# Patient Record
Sex: FEMALE | Race: AMERICAN INDIAN OR ALASKA NATIVE
[De-identification: names, ages, dates, MRNs, and addresses within clinical notes are randomized per-mention and may not be internally consistent; named-entity substitution may affect disease eponyms.]

---

## 2021-12-02 ENCOUNTER — HOSPITAL ENCOUNTER (OUTPATIENT)
Dept: HOSPITAL 5 - ED | Age: 41
Setting detail: OBSERVATION
LOS: 1 days | Discharge: LEFT BEFORE DISCHARGE | End: 2021-12-03
Attending: INTERNAL MEDICINE | Admitting: HOSPITALIST
Payer: MEDICAID

## 2021-12-02 DIAGNOSIS — R10.12: ICD-10-CM

## 2021-12-02 DIAGNOSIS — K85.90: ICD-10-CM

## 2021-12-02 DIAGNOSIS — Z79.899: ICD-10-CM

## 2021-12-02 DIAGNOSIS — R41.0: ICD-10-CM

## 2021-12-02 DIAGNOSIS — N30.01: Primary | ICD-10-CM

## 2021-12-02 DIAGNOSIS — R11.2: ICD-10-CM

## 2021-12-02 DIAGNOSIS — Z98.890: ICD-10-CM

## 2021-12-02 DIAGNOSIS — F19.10: ICD-10-CM

## 2021-12-02 PROCEDURE — 96361 HYDRATE IV INFUSION ADD-ON: CPT

## 2021-12-02 PROCEDURE — 84443 ASSAY THYROID STIM HORMONE: CPT

## 2021-12-02 PROCEDURE — G0480 DRUG TEST DEF 1-7 CLASSES: HCPCS

## 2021-12-02 PROCEDURE — 87040 BLOOD CULTURE FOR BACTERIA: CPT

## 2021-12-02 PROCEDURE — 83735 ASSAY OF MAGNESIUM: CPT

## 2021-12-02 PROCEDURE — 80048 BASIC METABOLIC PNL TOTAL CA: CPT

## 2021-12-02 PROCEDURE — 87086 URINE CULTURE/COLONY COUNT: CPT

## 2021-12-02 PROCEDURE — 84703 CHORIONIC GONADOTROPIN ASSAY: CPT

## 2021-12-02 PROCEDURE — 93005 ELECTROCARDIOGRAM TRACING: CPT

## 2021-12-02 PROCEDURE — 81001 URINALYSIS AUTO W/SCOPE: CPT

## 2021-12-02 PROCEDURE — 36415 COLL VENOUS BLD VENIPUNCTURE: CPT

## 2021-12-02 PROCEDURE — 99285 EMERGENCY DEPT VISIT HI MDM: CPT

## 2021-12-02 PROCEDURE — 96372 THER/PROPH/DIAG INJ SC/IM: CPT

## 2021-12-02 PROCEDURE — 85007 BL SMEAR W/DIFF WBC COUNT: CPT

## 2021-12-02 PROCEDURE — 82140 ASSAY OF AMMONIA: CPT

## 2021-12-02 PROCEDURE — 85025 COMPLETE CBC W/AUTO DIFF WBC: CPT

## 2021-12-02 PROCEDURE — G0378 HOSPITAL OBSERVATION PER HR: HCPCS

## 2021-12-02 PROCEDURE — 71045 X-RAY EXAM CHEST 1 VIEW: CPT

## 2021-12-02 PROCEDURE — 80076 HEPATIC FUNCTION PANEL: CPT

## 2021-12-02 PROCEDURE — 96375 TX/PRO/DX INJ NEW DRUG ADDON: CPT

## 2021-12-02 PROCEDURE — 80307 DRUG TEST PRSMV CHEM ANLYZR: CPT

## 2021-12-02 PROCEDURE — 74177 CT ABD & PELVIS W/CONTRAST: CPT

## 2021-12-02 PROCEDURE — 85730 THROMBOPLASTIN TIME PARTIAL: CPT

## 2021-12-02 PROCEDURE — 83690 ASSAY OF LIPASE: CPT

## 2021-12-02 PROCEDURE — 85610 PROTHROMBIN TIME: CPT

## 2021-12-02 PROCEDURE — 96365 THER/PROPH/DIAG IV INF INIT: CPT

## 2021-12-02 PROCEDURE — 84484 ASSAY OF TROPONIN QUANT: CPT

## 2021-12-02 PROCEDURE — 71275 CT ANGIOGRAPHY CHEST: CPT

## 2021-12-02 PROCEDURE — 80320 DRUG SCREEN QUANTALCOHOLS: CPT

## 2021-12-03 VITALS — SYSTOLIC BLOOD PRESSURE: 104 MMHG | DIASTOLIC BLOOD PRESSURE: 64 MMHG

## 2021-12-03 LAB
ALBUMIN SERPL-MCNC: 4.5 G/DL (ref 3.9–5)
ALT SERPL-CCNC: 33 UNITS/L (ref 7–56)
APTT BLD: 29.7 SEC. (ref 24.2–36.6)
BAND NEUTROPHILS # (MANUAL): 0 K/MM3
BILIRUB DIRECT SERPL-MCNC: 0.5 MG/DL (ref 0–0.2)
BILIRUB UR QL STRIP: (no result)
BLOOD UR QL VISUAL: (no result)
BUN SERPL-MCNC: 9 MG/DL (ref 7–17)
BUN/CREAT SERPL: 11 %
CALCIUM SERPL-MCNC: 9.6 MG/DL (ref 8.4–10.2)
HCT VFR BLD CALC: 38.6 % (ref 30.3–42.9)
HEMOLYSIS INDEX: 5
HGB BLD-MCNC: 12.8 GM/DL (ref 10.1–14.3)
INR PPP: 0.85 (ref 0.87–1.13)
MCHC RBC AUTO-ENTMCNC: 33 % (ref 30–34)
MCV RBC AUTO: 91 FL (ref 79–97)
MUCOUS THREADS #/AREA URNS HPF: (no result) /HPF
MYELOCYTES # (MANUAL): 0 K/MM3
PH UR STRIP: 8 [PH] (ref 5–7)
PLATELET # BLD: 340 K/MM3 (ref 140–440)
PROMYELOCYTES # (MANUAL): 0 K/MM3
PROT UR STRIP-MCNC: (no result) MG/DL
RBC # BLD AUTO: 4.24 M/MM3 (ref 3.65–5.03)
RBC #/AREA URNS HPF: 24 /HPF (ref 0–6)
TOTAL CELLS COUNTED BLD: 100
UROBILINOGEN UR-MCNC: < 2 MG/DL (ref ?–2)
WBC #/AREA URNS HPF: 11 /HPF (ref 0–6)

## 2021-12-03 NOTE — HISTORY AND PHYSICAL REPORT
History of Present Illness


Date of examination: 12/03/21


Date of admission: 


12/03/21 04:48





Chief complaint: 





Abdominal pain


History of present illness: 





41-year-old female with history of pancreatitis was brought to the emergency 

room because of upper abdominal/lower chest pain which wraps around the left 

flank which has been dull but with intermittent exacerbation for the last 5 

hours. Patient states that she has a cavity and took some ibuprofen earlier. She

also admits to taking an ecstasy pill and smoking marijuana earlier. She tried 

to take a bath but developed severe pain in her upper abdomen/lower chest and 

left flank area which caused her to vomit. She also states that the pain is 

worsened by taking deep breaths and she feels short of breath. She denies use of

alcohol or any other drugs. She she has not tried taking anything for her 

symptoms. In the ambulance she received 30 mg of Toradol with only very 

temporary relief. According to the EMS report, the patient's fingerstick blood 

glucose was 155 and her vital signs were within normal limits. The patient 

states that her LMP was approximately 1 month ago. She is not vaccinated against

COVID-19. She says that for the past 1 week she has been working a lot and has 

not been eating very much. She denies any recent travel, surgery, or prolonged 

immobilization.She has a nonfocal neurologic exam but displays behavior 

consistent with hyperactive delirium, especially in the context of admitted drug

use.








In the emergency room CT scan of the abdomen shows mild periportal edema in the 

liver is a nonspecific finding a small corpus luteal cyst in the right ovary and

trace free fluid in the cul-de-sac are physiologic.  Patient also has a UTI.  So

going to admit the patient we will put the patient on IV fluid and antibiotic we

will send the urine for culture





Past History


Past Medical History: other (Pancreatitis)





Medications and Allergies


                                    Allergies











Allergy/AdvReac Type Severity Reaction Status Date / Time


 


No Known Allergies Allergy   Verified 12/03/21 01:35











Active Meds: 


Active Medications





Ziprasidone (Ziprasidone Mesylate 20 Mg Vial)  10 mg IM Q2H PRN


   PRN Reason: Agitation


   Last Admin: 12/03/21 01:54 Dose:  10 mg


   Documented by: 











Review of Systems


All systems: negative


Gastrointestinal: abdominal pain, nausea, vomiting





Exam





- Constitutional


Vitals: 


                                        











Temp Pulse Resp BP Pulse Ox


 


 98.4 F   90   20   140/73   98 


 


 12/03/21 00:07  12/03/21 00:07  12/03/21 00:48  12/03/21 00:07  12/03/21 00:07











General appearance: Present: no acute distress, well-nourished





- EENT


Eyes: Present: PERRL


ENT: hearing intact, clear oral mucosa





- Neck


Neck: Present: supple, normal ROM





- Respiratory


Respiratory effort: normal


Respiratory: bilateral: diminished





- Cardiovascular


Heart Sounds: Present: S1 & S2.  Absent: rub, click





- Extremities


Extremities: pulses symmetrical, No edema


Peripheral Pulses: within normal limits





- Abdominal


General gastrointestinal: Present: soft, tender, non-distended, normal bowel 

sounds


Female genitourinary: Present: normal





- Integumentary


Integumentary: Present: clear, warm, dry





- Musculoskeletal


Musculoskeletal: gait normal, strength equal bilaterally





- Psychiatric


Psychiatric: appropriate mood/affect, intact judgment & insight





- Neurologic


Neurologic: CNII-XII intact, moves all extremities





HEART Score





- HEART Score


Troponin: 


                                        











Troponin T  < 0.010 ng/mL (0.00-0.029)   12/03/21  01:28    














Results





- Labs


CBC & Chem 7: 


                                 12/03/21 01:28





                                 12/03/21 01:28


Labs: 


                             Laboratory Last Values











WBC  14.2 K/mm3 (4.5-11.0)  H  12/03/21  01:28    


 


RBC  4.24 M/mm3 (3.65-5.03)   12/03/21  01:28    


 


Hgb  12.8 gm/dl (10.1-14.3)   12/03/21  01:28    


 


Hct  38.6 % (30.3-42.9)   12/03/21  01:28    


 


MCV  91 fl (79-97)   12/03/21  01:28    


 


MCH  30 pg (28-32)   12/03/21  01:28    


 


MCHC  33 % (30-34)   12/03/21  01:28    


 


RDW  14.5 % (13.2-15.2)   12/03/21  01:28    


 


Plt Count  340 K/mm3 (140-440)   12/03/21  01:28    


 


Add Manual Diff  Complete   12/03/21  01:28    


 


Total Counted  100   12/03/21  01:28    


 


Seg Neuts % (Manual)  81.0 % (40.0-70.0)  H  12/03/21  01:28    


 


Lymphocytes % (Manual)  10.0 % (13.4-35.0)  L  12/03/21  01:28    


 


Monocytes % (Manual)  7.0 % (0.0-7.3)   12/03/21  01:28    


 


Eosinophils % (Manual)  1.0 % (0.0-4.3)   12/03/21  01:28    


 


Basophils % (Manual)  1.0 % (0.0-1.8)   12/03/21  01:28    


 


Nucleated RBC %  Not Reportable   12/03/21  01:28    


 


Seg Neutrophils # Man  11.5 K/mm3 (1.8-7.7)  H  12/03/21  01:28    


 


Band Neutrophils #  0.0 K/mm3  12/03/21  01:28    


 


Lymphocytes # (Manual)  1.4 K/mm3 (1.2-5.4)   12/03/21  01:28    


 


Abs React Lymphs (Man)  0.0 K/mm3  12/03/21  01:28    


 


Monocytes # (Manual)  1.0 K/mm3 (0.0-0.8)  H  12/03/21  01:28    


 


Eosinophils # (Manual)  0.1 K/mm3 (0.0-0.4)   12/03/21  01:28    


 


Basophils # (Manual)  0.1 K/mm3 (0.0-0.1)   12/03/21  01:28    


 


Metamyelocytes #  0.0 K/mm3  12/03/21  01:28    


 


Myelocytes #  0.0 K/mm3  12/03/21  01:28    


 


Promyelocytes #  0.0 K/mm3  12/03/21  01:28    


 


Blast Cells #  0.0 K/mm3  12/03/21  01:28    


 


WBC Morphology  Not Reportable   12/03/21  01:28    


 


Hypersegmented Neuts  Not Reportable   12/03/21  01:28    


 


Hyposegmented Neuts  Not Reportable   12/03/21  01:28    


 


Hypogranular Neuts  Not Reportable   12/03/21  01:28    


 


Smudge Cells  Not Reportable   12/03/21  01:28    


 


Toxic Granulation  Not Reportable   12/03/21  01:28    


 


Toxic Vacuolation  Not Reportable   12/03/21  01:28    


 


Dohle Bodies  Not Reportable   12/03/21  01:28    


 


Pelger-Huet Anomaly  Not Reportable   12/03/21  01:28    


 


Tereso Rods  Not Reportable   12/03/21  01:28    


 


Platelet Estimate  Not Reportable   12/03/21  01:28    


 


Clumped Platelets  Not Reportable   12/03/21  01:28    


 


Plt Clumps, EDTA  Not Reportable   12/03/21  01:28    


 


Large Platelets  Not Reportable   12/03/21  01:28    


 


Giant Platelets  Not Reportable   12/03/21  01:28    


 


Platelet Satelliting  Not Reportable   12/03/21  01:28    


 


Plt Morphology Comment  Not Reportable   12/03/21  01:28    


 


RBC Morphology  Normal   12/03/21  01:28    


 


Dimorphic RBCs  Not Reportable   12/03/21  01:28    


 


Polychromasia  Not Reportable   12/03/21  01:28    


 


Hypochromasia  Not Reportable   12/03/21  01:28    


 


Poikilocytosis  Not Reportable   12/03/21  01:28    


 


Anisocytosis  Not Reportable   12/03/21  01:28    


 


Microcytosis  Not Reportable   12/03/21  01:28    


 


Macrocytosis  Not Reportable   12/03/21  01:28    


 


Spherocytes  Not Reportable   12/03/21  01:28    


 


Pappenheimer Bodies  Not Reportable   12/03/21  01:28    


 


Sickle Cells  Not Reportable   12/03/21  01:28    


 


Target Cells  Not Reportable   12/03/21  01:28    


 


Tear Drop Cells  Not Reportable   12/03/21  01:28    


 


Ovalocytes  Not Reportable   12/03/21  01:28    


 


Helmet Cells  Not Reportable   12/03/21  01:28    


 


[place] Bodies  Not Reportable   12/03/21  01:28    


 


Cabot Rings  Not Reportable   12/03/21  01:28    


 


Cameron Cells  Not Reportable   12/03/21  01:28    


 


Bite Cells  Not Reportable   12/03/21  01:28    


 


Crenated Cell  Not Reportable   12/03/21  01:28    


 


Elliptocytes  Not Reportable   12/03/21  01:28    


 


Acanthocytes (Spur)  Not Reportable   12/03/21  01:28    


 


Rouleaux  Not Reportable   12/03/21  01:28    


 


Hemoglobin C Crystals  Not Reportable   12/03/21  01:28    


 


Schistocytes  Not Reportable   12/03/21  01:28    


 


Malaria parasites  Not Reportable   12/03/21  01:28    


 


Manuel Bodies  Not Reportable   12/03/21  01:28    


 


Hem Pathologist Commnt  No   12/03/21  01:28    


 


PT  12.6 Sec. (12.2-14.9)   12/03/21  01:28    


 


INR  0.85  (0.87-1.13)  L  12/03/21  01:28    


 


APTT  29.7 Sec. (24.2-36.6)   12/03/21  01:28    


 


Sodium  135 mmol/L (137-145)  L  12/03/21  01:28    


 


Potassium  3.8 mmol/L (3.6-5.0)   12/03/21  01:28    


 


Chloride  100.6 mmol/L ()   12/03/21  01:28    


 


Carbon Dioxide  19 mmol/L (22-30)  L  12/03/21  01:28    


 


Anion Gap  19 mmol/L  12/03/21  01:28    


 


BUN  9 mg/dL (7-17)   12/03/21  01:28    


 


Creatinine  0.8 mg/dL (0.6-1.2)   12/03/21  01:28    


 


Estimated GFR  > 60 ml/min  12/03/21  01:28    


 


BUN/Creatinine Ratio  11 %  12/03/21  01:28    


 


Glucose  153 mg/dL ()  H  12/03/21  01:28    


 


Calcium  9.6 mg/dL (8.4-10.2)   12/03/21  01:28    


 


Magnesium  1.90 mg/dL (1.7-2.3)   12/03/21  01:28    


 


Total Bilirubin  1.10 mg/dL (0.1-1.2)   12/03/21  01:28    


 


Direct Bilirubin  0.5 mg/dL (0-0.2)  H  12/03/21  01:28    


 


Indirect Bilirubin  0.6 mg/dL  12/03/21  01:28    


 


AST  67 units/L (5-40)  H  12/03/21  01:28    


 


ALT  33 units/L (7-56)   12/03/21  01:28    


 


Alkaline Phosphatase  75 units/L ()   12/03/21  01:28    


 


Ammonia  22.0 umol/L (25-60)  L  12/03/21  01:28    


 


Troponin T  < 0.010 ng/mL (0.00-0.029)   12/03/21  01:28    


 


Total Protein  8.2 g/dL (6.3-8.2)   12/03/21  01:28    


 


Albumin  4.5 g/dL (3.9-5)   12/03/21  01:28    


 


Albumin/Globulin Ratio  1.2 %  12/03/21  01:28    


 


Lipase  29 units/L (13-60)   12/03/21  01:28    


 


TSH  2.800 mlU/mL (0.270-4.200)   12/03/21  01:28    


 


HCG, Qual  Negative  (Negative)   12/03/21  01:28    


 


Urine Color  Yellow  (Yellow)   12/03/21  Unknown


 


Urine Turbidity  Cloudy  (Clear)   12/03/21  Unknown


 


Urine pH  8.0  (5.0-7.0)  H  12/03/21  Unknown


 


Ur Specific Gravity  1.030  (1.003-1.030)   12/03/21  Unknown


 


Urine Protein  <15 mg/dl mg/dL (Negative)   12/03/21  Unknown


 


Urine Glucose (UA)  Neg mg/dL (Negative)   12/03/21  Unknown


 


Urine Ketones  20 mg/dL (Negative)   12/03/21  Unknown


 


Urine Blood  Neg  (Negative)   12/03/21  Unknown


 


Urine Nitrite  Neg  (Negative)   12/03/21  Unknown


 


Urine Bilirubin  Neg  (Negative)   12/03/21  Unknown


 


Urine Urobilinogen  < 2.0 mg/dL (<2.0)   12/03/21  Unknown


 


Ur Leukocyte Esterase  Neg  (Negative)   12/03/21  Unknown


 


Urine WBC (Auto)  11.0 /HPF (0.0-6.0)  H  12/03/21  Unknown


 


Urine RBC (Auto)  24.0 /HPF (0.0-6.0)   12/03/21  Unknown


 


U Epithel Cells (Auto)  3.0 /HPF (0-13.0)   12/03/21  Unknown


 


Urine Mucus  Few /HPF  12/03/21  Unknown


 


Urine Yeast (Budding)  3+ /HPF  12/03/21  Unknown


 


Salicylates  0.3 mg/dL (2.8-20.0)  L  12/03/21  01:28    


 


Urine Opiates Screen  Negative   12/03/21  Unknown


 


Urine Methadone Screen  Negative   12/03/21  Unknown


 


Acetaminophen  5.0 ug/mL (10.0-30.0)  L  12/03/21  01:28    


 


Ur Barbiturates Screen  Negative   12/03/21  Unknown


 


Ur Phencyclidine Scrn  Negative   12/03/21  Unknown


 


Ur Amphetamines Screen  Negative   12/03/21  Unknown


 


U Benzodiazepines Scrn  Negative   12/03/21  Unknown


 


Urine Cocaine Screen  Negative   12/03/21  Unknown


 


U Marijuana (THC) Screen  Positive   12/03/21  Unknown


 


Drugs of Abuse Note  Disclamer   12/03/21  Unknown


 


Plasma/Serum Alcohol  < 0.01 % (0-0.07)   12/03/21  01:28    














- Imaging and Cardiology


CT scan - abdomen: report reviewed





Assessment and Plan


VTE prophylaxis?: Chemical


Plan of care discussed with patient/family: Yes





- Patient Problems


(1) Abdominal pain


Current Visit: Yes   Status: Acute   


Plan to address problem: 


Admit the patient to the medical floor.  Cardiac diet .IV fluid half-normal 

saline at the rate of 100 cc/h .  Pepcid 20 mg IV every 12 hours.  Zofran 4 mg 

every 6 hours as needed.  Morphine 2 mg IV every 4 hours as needed.  We will 

monitor the patient closely recheck CBC BMP in the morning








(2) UTI (urinary tract infection)


Current Visit: Yes   Status: Acute   


Plan to address problem: 


Rocephin 2 g IV daily.  We will send the urine for culture








(3) Nausea & vomiting


Current Visit: Yes   Status: Acute   


Plan to address problem: 


IV fluid half-normal saline at the rate of 100 cc/h .  Pepcid 20 mg IV every 12 

hours.  Zofran 4 mg every 6 hours as needed. 








(4) Drug abuse


Current Visit: Yes   Status: Acute   


Plan to address problem: 


Counseling the patient regarding quit taking drugs marijuana and ecstasy








(5) DVT prophylaxis


Current Visit: Yes   Status: Acute   


Plan to address problem: 


Heparin 5000 units subcu every 8 hours for DVT prophylaxis.  Pepcid 20 mg IV 

every 12 hours for GI prophylaxis.  Patient is a full code.

## 2021-12-03 NOTE — XRAY REPORT
CHEST 1 VIEW 12/2/2021 11:53 PM



INDICATION / CLINICAL INFORMATION: Chest and abdominal pain.



COMPARISON: None available.



FINDINGS:



SUPPORT DEVICES: None.

HEART / MEDIASTINUM: The heart size and pulmonary vasculature are normal. The aorta is normal in umer
reinaldo. 

LUNGS / PLEURA: No significant pulmonary or pleural abnormality. No pneumothorax. 



ADDITIONAL FINDINGS: The visualized abdomen is unremarkable.



IMPRESSION: No acute findings.



Signer Name: Giovanny Hull MD 

Signed: 12/3/2021 1:07 AM

Workstation Name: [id number]

## 2021-12-03 NOTE — CAT SCAN REPORT
CT OF THE ABDOMEN AND PELVIS WITH INTRAVENOUS CONTRAST 



INDICATION / CLINICAL INFORMATION: Lower Chest pain / Upper abd pain that radiates around to left.



TECHNIQUE: The patient received 100 cc Omnipaque 350 intravenously. All CT scans at this location are
 performed using CT dose reduction for ALARA by means of automated exposure control. 



COMPARISON: None available.



FINDINGS:

ABDOMEN: There is mild generalized periportal edema. The liver is otherwise unremarkable. The gallbla
dder, bile ducts, pancreas, spleen, adrenal glands, kidneys and bowel demonstrate no significant abno
rmality. No vascular abnormality is seen. There is no evidence of adenopathy. The lung bases are mitul
r.



PELVIS: The distal ureters and urinary bladder are normal. There is a small corpus luteal cyst in the
 right ovary with minimal free fluid in the cul-de-sac, physiologic. The uterus and left adnexa are n
ormal. The cecum is located in the mid to lower abdomen near the midline. A normal appendix is presen
t and there is no evidence of diverticulitis. There is mild diastases of the rectus sheath centered a
t the umbilicus. No osseous abnormality is seen.



IMPRESSION:

1. Mild periportal edema in the liver is a nonspecific finding.

2. Small corpus luteal cyst in the right ovary and trace free fluid in the cul-de-sac are physiologic
. 



Signer Name: Giovanny Hull MD 

Signed: 12/3/2021 3:37 AM

Workstation Name: [id number]

## 2021-12-03 NOTE — CAT SCAN REPORT
CTA CHEST WITH CONTRAST



INDICATION / CLINICAL INFORMATION: Lower Chest pain / Upper abd pain that radiates around to left.



TECHNIQUE: Axial CT images were obtained through the chest after injection of 100 cc Omnipaque 350 IV
 contrast. 3 plane MIP and/or 3D reconstructions were produced. All CT scans at this location are per
formed using CT dose reduction for ALARA by means of automated exposure control. 



COMPARISON: None available.



FINDINGS:

PULMONARY ARTERIES: Good opacification bilaterally without intraluminal filling defect to suggest PTE
.

THORACIC AORTA: No significant abnormality. 

HEART: No significant abnormality.

CORONARY ARTERY CALCIFICATION: None.

MEDIASTINUM / RANDELL: No significant abnormality.

PLEURA: No pleural effusion. No pneumothorax.

LUNGS: No acute air space or interstitial disease. Trace paraseptal emphysema in both lung apices pos
teriorly.. 



ADDITIONAL FINDINGS: None.



UPPER ABDOMEN: No acute findings.



SKELETAL STRUCTURES: No significant osseous abnormality.



IMPRESSION:

1. No CT evidence for pulmonary embolism. 

2. No acute findings.



Signer Name: Giovanny Hull MD 

Signed: 12/3/2021 3:33 AM

Workstation Name: [id number]

## 2021-12-03 NOTE — ELECTROCARDIOGRAPH REPORT
[facility]

                                       

Test Date:    2021               Test Time:    02:29:02

Pat Name:     ALEXANDER MCCAULEY            Department:   

Patient ID:   [id number]          Room:         [facility]

Gender:       F                        Technician:   CHRIS

:          1980               Requested By: ANNE MARIE BALTAZAR

Order Number: [id number]              Reading MD:   Austin Ojeda

                                 Measurements

Intervals                              Axis          

Rate:         90                       P:            72

RI:           153                      QRS:          8

QRSD:         80                       T:            31

QT:           375                                    

QTc:          459                                    

                           Interpretive Statements

Sinus rhythm

Consider anterior infarct

No previous ECG available for comparison

Electronically Signed On 12-3-2021 12:16:17 EST by Austin Ojeda

## 2021-12-03 NOTE — EMERGENCY DEPARTMENT REPORT
HPI





- General


Chief Complaint: Abdominal Pain


Time Seen by Provider: 12/03/21 00:04





- HPI


HPI: 





41-year-old female with remote history of pancreatitis brought in by EMS 

complaining of cute onset of upper abdominal/lower chest pain which wraps around

the left flank which has been dull but with intermittent exacerbation for the 

last 5 hours. Patient states that she has a cavity and took some ibuprofen 

earlier. She also admits to taking an ecstasy pill and smoking marijuana 

earlier. She tried to take a bath but developed severe pain in her upper abdomen

/lower chest and left flank area which caused her to vomit. She also states that

the pain is worsened by taking deep breaths and she feels short of breath. She 

denies use of alcohol or any other drugs. She she has not tried taking anything 

for her symptoms. In the ambulance she received 30 mg of Toradol with only very 

temporary relief. According to the EMS report, the patient's fingerstick blood g

lucose was 155 and her vital signs were within normal limits. The patient states

that her LMP was approximately 1 month ago. She is not vaccinated against COVID-

19. She says that for the past 1 week she has been working a lot and has not 

been eating very much. She denies any recent travel, surgery, or prolonged 

immobilization.





ED Past Medical Hx





- Past Medical History


Previous Medical History?: No


Additional medical history: pancreatitis





- Surgical History


Past Surgical History?: No





- Social History


Smoking Status: Never Smoker


Substance Use Type: Marijuana, Other (ecstasy)





- Medications


Home Medications: 


                                Home Medications











 Medication  Instructions  Recorded  Confirmed  Last Taken  Type


 


Famotidine [Pepcid] 40 mg PO QHS #30 tablet 12/04/21  Unknown Rx


 


Ondansetron [Zofran Odt] 4 mg PO Q8HR PRN #8 tab.rapdis 12/04/21  Unknown Rx


 


Sucralfate [Carafate] 1 gm PO ACHS 7 Days #21 tablet 12/04/21  Unknown Rx


 


cephALEXin [Keflex] 500 mg PO BID 7 Days #14 capsule 12/04/21  Unknown Rx














ED Review of Systems


ROS: 


Stated complaint: LEFT SIDE ABDOMINAL PAIN


Other details as noted in HPI





Constitutional: denies: chills, fever


Eyes: denies: eye pain, vision change


ENT: denies: throat pain, congestion


Respiratory: shortness of breath.  denies: cough


Cardiovascular: denies: chest pain, palpitations, syncope


Gastrointestinal: abdominal pain, nausea, vomiting.  denies: diarrhea


Genitourinary: denies: dysuria, frequency


Musculoskeletal: denies: back pain, arthralgia


Skin: denies: rash, lesions


Neurological: denies: headache, weakness, numbness


Psychiatric: anxiety.  denies: auditory hallucinations, visual hallucinations, 

homicidal thoughts, suicidal thoughts





Physical Exam





- Physical Exam


Vital Signs: 


                                   Vital Signs











  12/03/21





  00:07


 


Temperature 98.4 F


 


Pulse Rate 90


 


Respiratory 22





Rate 


 


Blood Pressure 140/73





[Left] 


 


O2 Sat by Pulse 98





Oximetry 











Physical Exam: 





GENERAL: Well developed and well nourished.  In moderate distress secondary to 

pain


HEAD: Normocephalic. No obvious signs of trauma. 


ENT: Dry mucous membranes.  Obvious caries of the right lateral incisor.  No 

evidence of periapical abscess.


EYES: Extraocular movements are intact. Pupils are equal round and reactive to 

light bilaterally


NECK: Supple. Full ROM is intact. Trachea is midline.


LUNGS: Tachypneic.  Equal chest rise bilaterally. Clear to auscultation 

bilaterally.


CARDIOVASCULAR: Tachycardic but with regular rhythm. No murmurs or rubs.


VASCULAR: Cap refill < 2 seconds


ABDOMEN: Abdomen is soft and nondistended.  There is tenderness of the upper 

epigastrium with minimal tenderness in the left upper and right upper quadrants 

of the abdomen as well.  There is no guarding or rebound tenderness.


SKIN: Skin is warm and dry


NEURO: Patient is awake, alert, and oriented.  She is hyperverbal with pressured

 speech.  CNs II-XII grossly intact. No focal deficits. Normal motor and sensory

 exam throughout.  No dysarthria.


MUSCULOSKELETAL: No obvious deformities. No significant tenderness. Normal ROM 

throughout. 


BACK/SPINE: No costovertebral angle tenderness.





ED Course


                                   Vital Signs











  12/03/21





  00:07


 


Temperature 98.4 F


 


Pulse Rate 90


 


Respiratory 22





Rate 


 


Blood Pressure 140/73





[Left] 


 


O2 Sat by Pulse 98





Oximetry 














ED Medical Decision Making





- Lab Data


Result diagrams: 


                                 12/03/21 01:28





                                 12/03/21 01:28





- EKG Data


-: EKG Interpreted by Me





- EKG Data





12/03/21 02:34


Normal sinus rhythm.  Normal axis.  Normal intervals.  No ectopy.  No 

significant ST segment or T wave abnormalities.





- Radiology Data


Radiology results: report reviewed





- Medical Decision Making





41-year-old female presenting with acute onset of mid lower chest/upper 

abdominal pain which started approximately 5 minutes prior to arrival.  Patient 

admits to taking ecstasy and smoking marijuana today.  Patient describes her 

pain as pleuritic and associated with shortness of breath as well as nausea with

 one episode of vomiting.  On initial assessment she is afebrile and with normal

 vital signs other than elevated blood pressure.  On exam patient is hyperverbal

 and with pressured speech.  Very easily agitated and writhing around in the 

hospital bed.  She has dry mucous membranes.  Lungs are clear to auscultation.  

There is tenderness of the mid lower chest/upper epigastrium with minimal 

tenderness of the bilateral upper quadrants of the abdomen.  There is no CVA 

tenderness.  She has a nonfocal neurologic exam but displays behavior consistent

 with hyperactive delirium, especially in the context of admitted drug use.  We 

will perform broad work-up with a full set of labs, EKG, and chest x-ray.  In 

addition, given the nature of the patient's pain associated with shortness of 

breath and pleuritic pain, we will obtain CT angiogram of the 

chest/abdomen/pelvis to assess for evidence of PE, pneumonia, pancreatitis, 

kidney stone, appendicitis, versus other intra-abdominal or intrathoracic 

catastrophe.  We will give 1 L of IV fluids, Ativan, Dilaudid, and Zofran and 

reassess frequently.





Patient continues to be extremely agitated and with aggressive behavior, not 

following commands and combative with staff.  For her safety and the safety of 

staff Geodon was administered.  





After Geodon administration, the patient is more sedated and calm.  Vital signs 

are stable.





Labs reveal leukocytosis of 14.2.  There is no significant anemia.  Kidney 

function is normal and there are no significant electrolyte abnormalities.  AST 

is only slightly elevated.  Of note, patient reports to me that she has a remote

 history of suicide attempt many years ago which damaged her liver at that time.

 





CTA of the chest/abdomen/pelvis reveals no evidence of PE or any other 

long/intrathoracic abnormality.  There is mild periportal edema of the liver but

 no other significant findings in the abdomen/pelvis.





Urinalysis reveals evidence of urinary tract infection.  We will therefore 

administer ceftriaxone and plan to admit the patient to medicine for further 

work-up and management.





Case discussed with Dr. Millan at 4:50 AM who accepts patient for admission and 

will assume care.


Critical Care Time: Yes


Critical care attestation.: 


If time is entered above; I have spent that time in minutes in the direct care 

of this critically ill patient, excluding procedure time.


Critical care time was spent in the evaluation/assessment, work-up, and 

management of hyperactive delirium requiring administration of sedative 

medication, extensive labs and imaging, and frequent bedside reassessment.





ED Disposition


Clinical Impression: 


 Acute hyperactive delirium due to multiple etiologies, MDMA abuse, Dental 

caries





UTI (urinary tract infection)


Qualifiers:


 Urinary tract infection type: acute cystitis Hematuria presence: with hematuria

 Qualified Code(s): N30.01 - Acute cystitis with hematuria





Abdominal pain


Qualifiers:


 Abdominal location: left upper quadrant Qualified Code(s): R10.12 - Left upper 

quadrant pain





Disposition: 09 ADMITTED AS INPATIENT


Is pt being admited?: Yes

## 2021-12-04 ENCOUNTER — HOSPITAL ENCOUNTER (EMERGENCY)
Dept: HOSPITAL 5 - ED | Age: 41
Discharge: HOME | End: 2021-12-04
Payer: MEDICAID

## 2021-12-04 VITALS — DIASTOLIC BLOOD PRESSURE: 94 MMHG | SYSTOLIC BLOOD PRESSURE: 132 MMHG

## 2021-12-04 DIAGNOSIS — N83.201: Primary | ICD-10-CM

## 2021-12-04 DIAGNOSIS — F12.90: ICD-10-CM

## 2021-12-04 DIAGNOSIS — R74.01: ICD-10-CM

## 2021-12-04 DIAGNOSIS — Z79.899: ICD-10-CM

## 2021-12-04 DIAGNOSIS — K08.89: ICD-10-CM

## 2021-12-04 DIAGNOSIS — N39.0: ICD-10-CM

## 2021-12-04 PROCEDURE — 99282 EMERGENCY DEPT VISIT SF MDM: CPT

## 2022-12-05 ENCOUNTER — TELEPHONE ENCOUNTER (OUTPATIENT)
Dept: URBAN - METROPOLITAN AREA CLINIC 92 | Facility: CLINIC | Age: 42
End: 2022-12-05

## 2022-12-06 ENCOUNTER — DASHBOARD ENCOUNTERS (OUTPATIENT)
Age: 42
End: 2022-12-06

## 2022-12-06 ENCOUNTER — OFFICE VISIT (OUTPATIENT)
Dept: URBAN - METROPOLITAN AREA CLINIC 96 | Facility: CLINIC | Age: 42
End: 2022-12-06
Payer: COMMERCIAL

## 2022-12-06 ENCOUNTER — WEB ENCOUNTER (OUTPATIENT)
Dept: URBAN - METROPOLITAN AREA CLINIC 96 | Facility: CLINIC | Age: 42
End: 2022-12-06

## 2022-12-06 VITALS
HEIGHT: 66 IN | RESPIRATION RATE: 20 BRPM | WEIGHT: 176 LBS | BODY MASS INDEX: 28.28 KG/M2 | DIASTOLIC BLOOD PRESSURE: 77 MMHG | OXYGEN SATURATION: 98 % | SYSTOLIC BLOOD PRESSURE: 122 MMHG | HEART RATE: 82 BPM | TEMPERATURE: 96.5 F

## 2022-12-06 DIAGNOSIS — Z87.11 PERSONAL HISTORY OF GASTRIC ULCER: ICD-10-CM

## 2022-12-06 DIAGNOSIS — R10.13 EPIGASTRIC PAIN: ICD-10-CM

## 2022-12-06 DIAGNOSIS — R68.81 EARLY SATIETY: ICD-10-CM

## 2022-12-06 DIAGNOSIS — R14.0 BLOATING: ICD-10-CM

## 2022-12-06 PROCEDURE — 99204 OFFICE O/P NEW MOD 45 MIN: CPT | Performed by: INTERNAL MEDICINE

## 2022-12-06 NOTE — PHYSICAL EXAM GASTROINTESTINAL
Abdomen , soft, tender in epigastric area, nondistended , no guarding or rigidity , no masses palpable , normal bowel sounds , Liver and Spleen,  no hepatosplenomegaly , liver nontender

## 2022-12-06 NOTE — HPI-TODAY'S VISIT:
42-year-old female recently seen at [facility] emergency department 10/27/2022 complaining of abdominal pain.  Laboratory studies White cell count 10.2, hemoglobin 12.7, platelets 420.   AST 16, ALT 11, lipase 30, total bilirubin 0.7.  CT abdomen pelvis with contrast 10/26/2022 with right ovary 4.8 cm cyst, normal appendix.  Bowel without obstruction.  No bowel wall edema.  Gallbladder unremarkable with extrahepatic bile duct not dilated.  Liver unremarkable.  Spleen unremarkable.  Pancreas unremarkable.  (See chart for reviewed records).  Patient reports "ulcer in stomach". Reports 2016 UGI was done and patient was told she had an ulcer. Treated with medication. No prior H pylori infection. Was not taking any chronic NSAIDs at that time. Patient now reports having abdominal pain similar to prior gastric ulcer and wonders if has recurrent ulcer. Reports epigastric pain, worse with certain foods. No n/v. No f/c. No repeat episode of pain since 10/27/2022. BM with occasional constipation.   No melena, no rectal bleeding, no CP or SOB. No unintentional weight loss. No dysphagia, no odynophagia. No chronic NSAIDs. No MJ use since 8/15/2022. Reports bloating, no early satiety. Mild heartburn, not taking any meds for such.  No change pregnant. UTD with GYN.  Depression followed by a therapist. No eating d/o. No food allergies or diet restrictions.

## 2022-12-22 NOTE — PHYSICAL EXAM CONSTITUTIONAL:
well developed, well nourished , in no acute distress , ambulating without difficulty , normal communication ability show

## 2023-01-27 ENCOUNTER — OFFICE VISIT (OUTPATIENT)
Dept: URBAN - METROPOLITAN AREA SURGERY CENTER 18 | Facility: SURGERY CENTER | Age: 43
End: 2023-01-27
Payer: COMMERCIAL

## 2023-01-27 ENCOUNTER — WEB ENCOUNTER (OUTPATIENT)
Dept: URBAN - METROPOLITAN AREA SURGERY CENTER 18 | Facility: SURGERY CENTER | Age: 43
End: 2023-01-27

## 2023-01-27 DIAGNOSIS — K31.89 ACQUIRED DEFORMITY OF DUODENUM: ICD-10-CM

## 2023-01-27 DIAGNOSIS — R10.13 ABDOMINAL DISCOMFORT, EPIGASTRIC: ICD-10-CM

## 2023-01-27 DIAGNOSIS — K22.89 DILATATION OF ESOPHAGUS: ICD-10-CM

## 2023-01-27 PROCEDURE — G8907 PT DOC NO EVENTS ON DISCHARG: HCPCS | Performed by: INTERNAL MEDICINE

## 2023-01-27 PROCEDURE — 43239 EGD BIOPSY SINGLE/MULTIPLE: CPT | Performed by: INTERNAL MEDICINE

## 2024-10-28 ENCOUNTER — WALK IN (OUTPATIENT)
Dept: URGENT CARE | Age: 44
End: 2024-10-28

## 2024-10-28 VITALS
BODY MASS INDEX: 28.49 KG/M2 | TEMPERATURE: 97.6 F | DIASTOLIC BLOOD PRESSURE: 86 MMHG | HEIGHT: 65 IN | WEIGHT: 171 LBS | RESPIRATION RATE: 16 BRPM | SYSTOLIC BLOOD PRESSURE: 120 MMHG | HEART RATE: 85 BPM

## 2024-10-28 DIAGNOSIS — R30.0 DYSURIA: ICD-10-CM

## 2024-10-28 DIAGNOSIS — N89.8 VAGINAL DISCHARGE: Primary | ICD-10-CM

## 2024-10-28 LAB
APPEARANCE, POC: ABNORMAL
B-HCG UR QL: NEGATIVE
BILIRUB UR QL STRIP: NEGATIVE
CLUE CELLS VAG QL WET PREP: NORMAL
COLOR UR: YELLOW
GLUCOSE UR-MCNC: NEGATIVE MG/DL
HGB UR QL STRIP: NEGATIVE
INTERNAL PROCEDURAL CONTROLS ACCEPTABLE: YES
KETONES UR STRIP-MCNC: NEGATIVE MG/DL
LEUKOCYTE ESTERASE UR QL STRIP: ABNORMAL
NITRITE UR QL STRIP: NEGATIVE
PH UR: 6 [PH] (ref 5–7)
PROT UR-MCNC: NEGATIVE MG/DL
SP GR UR: >= 1.03 (ref 1–1.03)
T VAGINALIS VAG QL WET PREP: NORMAL
TEST LOT EXPIRATION DATE: NORMAL
TEST LOT NUMBER: NORMAL
UROBILINOGEN UR-MCNC: 0.2 MG/DL (ref 0–1)
YEAST VAG QL WET PREP: NORMAL

## 2024-10-28 PROCEDURE — 81025 URINE PREGNANCY TEST: CPT | Performed by: NURSE PRACTITIONER

## 2024-10-28 PROCEDURE — 87086 URINE CULTURE/COLONY COUNT: CPT | Performed by: CLINICAL MEDICAL LABORATORY

## 2024-10-28 PROCEDURE — 87210 SMEAR WET MOUNT SALINE/INK: CPT | Performed by: INTERNAL MEDICINE

## 2024-10-28 PROCEDURE — 87481 CANDIDA DNA AMP PROBE: CPT | Performed by: CLINICAL MEDICAL LABORATORY

## 2024-10-28 PROCEDURE — 81513 NFCT DS BV RNA VAG FLU ALG: CPT | Performed by: CLINICAL MEDICAL LABORATORY

## 2024-10-28 PROCEDURE — 87661 TRICHOMONAS VAGINALIS AMPLIF: CPT | Performed by: CLINICAL MEDICAL LABORATORY

## 2024-10-28 PROCEDURE — 87563 M. GENITALIUM AMP PROBE: CPT | Performed by: CLINICAL MEDICAL LABORATORY

## 2024-10-28 PROCEDURE — 87591 N.GONORRHOEAE DNA AMP PROB: CPT | Performed by: CLINICAL MEDICAL LABORATORY

## 2024-10-28 PROCEDURE — 99203 OFFICE O/P NEW LOW 30 MIN: CPT | Performed by: NURSE PRACTITIONER

## 2024-10-28 PROCEDURE — 87491 CHLMYD TRACH DNA AMP PROBE: CPT | Performed by: CLINICAL MEDICAL LABORATORY

## 2024-10-28 PROCEDURE — 81003 URINALYSIS AUTO W/O SCOPE: CPT | Performed by: NURSE PRACTITIONER

## 2024-10-28 ASSESSMENT — ENCOUNTER SYMPTOMS
FEVER: 0
DIARRHEA: 0
SORE THROAT: 0
BACK PAIN: 1
CHILLS: 0
VOMITING: 0
NAUSEA: 0

## 2024-10-29 ENCOUNTER — TELEPHONE (OUTPATIENT)
Dept: URGENT CARE | Age: 44
End: 2024-10-29

## 2024-10-29 LAB
BACTERIAL VAGINOSIS VAG-IMP: POSITIVE
C ALBICANS DNA VAG QL NAA+PROBE: POSITIVE
C GLABRATA DNA VAG QL NAA+PROBE: NOT DETECTED
C TRACH RRNA CVX QL NAA+PROBE: NEGATIVE
Lab: NORMAL
M GENITALIUM DNA SPEC QL NAA+PROBE: NOT DETECTED
N GONORRHOEA RRNA CVX QL NAA+PROBE: NEGATIVE
SERVICE CMNT-IMP: ABNORMAL
SERVICE CMNT-IMP: ABNORMAL
SERVICE CMNT-IMP: NORMAL
T VAGINALIS DNA VAG QL NAA+PROBE: POSITIVE

## 2024-10-29 RX ORDER — FLUCONAZOLE 150 MG/1
TABLET ORAL
Qty: 2 TABLET | Refills: 0 | Status: SHIPPED | OUTPATIENT
Start: 2024-10-29

## 2024-10-29 RX ORDER — METRONIDAZOLE 500 MG/1
500 TABLET ORAL 2 TIMES DAILY
Qty: 14 TABLET | Refills: 0 | Status: SHIPPED | OUTPATIENT
Start: 2024-10-29 | End: 2024-11-05

## 2024-10-30 ENCOUNTER — TELEPHONE (OUTPATIENT)
Dept: URGENT CARE | Age: 44
End: 2024-10-30

## 2024-10-30 LAB — BACTERIA UR CULT: NO GROWTH
